# Patient Record
Sex: FEMALE | Race: WHITE
[De-identification: names, ages, dates, MRNs, and addresses within clinical notes are randomized per-mention and may not be internally consistent; named-entity substitution may affect disease eponyms.]

---

## 2020-11-11 ENCOUNTER — HOSPITAL ENCOUNTER (INPATIENT)
Dept: HOSPITAL 11 - JP.2SS | Age: 85
LOS: 6 days | Discharge: NURSING, REHAB OR LONG-TERM CARE | DRG: 951 | End: 2020-11-17
Attending: INTERNAL MEDICINE | Admitting: INTERNAL MEDICINE
Payer: MEDICARE

## 2020-11-11 DIAGNOSIS — J12.89: ICD-10-CM

## 2020-11-11 DIAGNOSIS — R45.1: ICD-10-CM

## 2020-11-11 DIAGNOSIS — S22.089D: ICD-10-CM

## 2020-11-11 DIAGNOSIS — Z86.19: ICD-10-CM

## 2020-11-11 DIAGNOSIS — Z51.5: Primary | ICD-10-CM

## 2020-11-11 DIAGNOSIS — Z79.899: ICD-10-CM

## 2020-11-11 DIAGNOSIS — R63.0: ICD-10-CM

## 2020-11-11 DIAGNOSIS — F03.90: ICD-10-CM

## 2020-11-11 DIAGNOSIS — Z66: ICD-10-CM

## 2020-11-11 DIAGNOSIS — Z88.5: ICD-10-CM

## 2020-11-11 DIAGNOSIS — Z79.82: ICD-10-CM
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
## 2020-11-11 DIAGNOSIS — Z88.2: ICD-10-CM

## 2020-11-11 DIAGNOSIS — Z88.6: ICD-10-CM

## 2020-11-11 DIAGNOSIS — Z88.1: ICD-10-CM

## 2020-11-11 DIAGNOSIS — S72.91XD: ICD-10-CM

## 2020-11-11 RX ADMIN — LORAZEPAM PRN MG: 2 LIQUID ORAL at 17:16

## 2020-11-11 RX ADMIN — LORAZEPAM PRN MG: 2 LIQUID ORAL at 22:44

## 2020-11-11 RX ADMIN — LORAZEPAM PRN MG: 2 LIQUID ORAL at 20:54

## 2020-11-11 NOTE — PCM.HP.2
H&P History of Present Illness





- General


Date of Service: 11/11/20


Admit Problem/Dx: 


                           Admission Diagnosis/Problem





Admission Diagnosis/Problem      Palliative care








Source of Information: Old Records, Provider, RN Notes Reviewed.  No: Patient


History Limitations: Reports: Other (Confused and agitated)





- History of Present Illness


Initial Comments - Free Text/Narative: 





Ms. Patten is an 85-year-old woman who was admitted to swing bed status 

on transfer from [facility] in [facility].  She has been 

hospitalized there over the past week and a half with hypoxia secondary to Covid

and bilateral pneumonia, acute kidney injury, right femur fracture, and T12 

fracture.  Acute kidney injury has resolved and she is over most of her Covid 

infection.  During the course of hospitalization she became more confused and 

agitated, not cooperating with cares including refusing to eat.  Patient's 

family has requested comfort cares only and she is transferred to our facility 

to swing bed status for palliative care.  Because of her confusion and agitation

she is unable to provide meaningful history concerning her recent symptoms or 

review of systems.





- Related Data


Allergies/Adverse Reactions: 


                                    Allergies











Allergy/AdvReac Type Severity Reaction Status Date / Time


 


Sulfa (Sulfonamide Allergy  Hives Verified 06/23/14 12:33





Antibiotics)     


 


etodolac [From Lodine] AdvReac  Diarrhea Verified 11/11/20 17:11


 


indomethacin AdvReac  Nausea and Verified 11/11/20 17:11





   Vomiting  


 


tramadol AdvReac  Nausea and Verified 11/11/20 17:11





   Vomiting  











Home Medications: 


                                    Home Meds





Aspirin 81 mg PO DAILY 06/23/14 [History]


Ibuprofen [Wal-Profen] 200 mg PO BEDTIME 06/23/14 [History]


Lisinopril/Hydrochlorothiazide [Lisinopril-Hctz 20-12.5 mg Tab] 1 each PO DAILY 

06/23/14 [History]











H&P Review of Systems





- Review of Systems:


Review Of Systems: See Below


General: Reports: ROS unobtainable (Secondary to confusion and agitation)





Exam





- Exam


Exam: See Below





- Exam


General: Lethargic.  No: Cooperative


HEENT: Conjunctiva Clear, Mucosa Moist & Pink, Normal Nasal Septum, Posterior 

Pharynx Clear, Pupils Equal


Neck: Supple, Trachea Midline, +2 Carotid Pulse wo Bruit


Lungs: Clear to Auscultation, Normal Respiratory Effort


Cardiovascular: Regular Rate, Regular Rhythm, Normal S1, Normal S2.  No: 

Systolic Murmur, Diastolic Murmur


GI/Abdominal Exam: Soft, Non-Tender, No Organomegaly, No Distention


Extremities: Non-Tender, No Pedal Edema, Other (Right lower extremity is in a 

brace)


Skin: Warm, Dry


Psychiatric: Agitated





*Q Meaningful Use (ADM)





- VTE *Q


VTE Anticoagulation Contraindications: Med/TX Not Indicated/Need





- VTE Risk Assess *Q


Each Risk Factor Represents 1 Point: None


Total Score 1 Point Risk Factors: 0


Each Risk Factor Represents 2 Points: None


Total Score 2 Point Risk Factors: 0


Each Risk Factor Represents 3 Points: Age 75 Years or Greater


Total Score 3 Point Risk Factors: 3


Each Risk Factor Represents 5 Points: Hip, Pelvis or Leg Fracture, Less than 1 

month


Total Score 5 Point Risk Factors: 5


Venous Thromboembolism Risk Factor Score *Q: 8


Problem List Initiated/Reviewed/Updated: Yes


Orders Last 24hrs: 


                               Active Orders 24 hr











 Category Date Time Status


 


 Patient Status [ADT] Routine ADT  11/11/20 16:21 Active


 


 Notify Provider Vital Signs [RC] ASDIRECTED Care  11/11/20 16:21 Active


 


 Oxygen Therapy [RC] PRN Care  11/11/20 16:21 Active


 


 Up With Assistance [RC] ASDIRECTED Care  11/11/20 16:21 Active


 


 Up to Chair [RC] QID Care  11/11/20 16:21 Active


 


 VTE/DVT Education [RC] Per Unit Routine Care  11/11/20 16:21 Active


 


 Vital Signs [RC] Q4H Care  11/11/20 16:21 Active


 


 Regular Diet [DIET] Diet  11/11/20 Lunch Active


 


 Acetaminophen [TylenoL] Med  11/11/20 16:21 Active





 650 mg PO Q4H PRN   


 


 LORazepam [Ativan ORAL Concentrate 1MG/0.5 ML U/D] Med  11/11/20 16:24 Ordered





 0.5 mg PO Q2H PRN   


 


 Morphine [Morphine 10 MG/0.5 ML Oral Syringe] Med  11/11/20 16:24 Ordered





 5 mg PO Q1H PRN   


 


 Ondansetron [Zofran ODT] Med  11/11/20 16:21 Ordered





 4 mg PO Q6H PRN   


 


 polyethylene glycoL 3350 [MiraLAX] Med  11/11/20 16:21 Ordered





 17 gm PO DAILY PRN   


 


 Resuscitation Status Routine Resus Stat  11/11/20 16:21 Ordered








                                Medication Orders





Acetaminophen (Tylenol)  650 mg PO Q4H PRN


   PRN Reason: Pain (Mild 1-3)/fever


Lorazepam (Ativan Oral Concentrate 1mg/0.5 Ml U/D)  0.5 mg PO Q2H PRN


   PRN Reason: Anxiety


Morphine Sulfate (Morphine 10 Mg/0.5 Ml Oral Syringe)  5 mg PO Q1H PRN


   PRN Reason: Pain


Ondansetron HCl (Zofran Odt)  4 mg PO Q6H PRN


   PRN Reason: Nausea able to take PO


Polyethylene Glycol (Miralax)  17 gm PO DAILY PRN


   PRN Reason: Constipation








Assessment/Plan Comment:: 





ASSESSMENT AND PLAN





PALLIATIVE CARE-patient has shown significant deterioration with recent Covid 

infection and femur fracture.  Family does not want further aggressive 

intervention or cares and has requested comfort cares only.


-Consider hospice admission


-Morphine and or lorazepam as needed for comfort


-No further aggressive interventions or evaluation





RECENT COVID INFECTION WITH BILATERAL PNEUMONIA AND HYPOXIA-respiratory status 

stable





ACUTE KIDNEY INJURY-resolved





CONFUSION AND AGITATION-history of underlying dementia





FEMUR FRACTURE


-Continue use of current brace


-Pain medication as needed





- Mortality Measure


Prognosis:: Poor

## 2020-11-12 RX ADMIN — LORAZEPAM PRN MG: 2 LIQUID ORAL at 22:37

## 2020-11-12 RX ADMIN — LORAZEPAM PRN MG: 2 LIQUID ORAL at 19:44

## 2020-11-12 RX ADMIN — LORAZEPAM PRN MG: 2 LIQUID ORAL at 07:53

## 2020-11-12 RX ADMIN — LORAZEPAM PRN MG: 2 LIQUID ORAL at 01:10

## 2020-11-12 RX ADMIN — LORAZEPAM PRN MG: 2 LIQUID ORAL at 09:53

## 2020-11-12 RX ADMIN — Medication SCH: at 20:01

## 2020-11-12 RX ADMIN — DIVALPROEX SODIUM SCH MG: 125 CAPSULE, COATED PELLETS ORAL at 16:59

## 2020-11-13 RX ADMIN — LORAZEPAM PRN MG: 2 LIQUID ORAL at 01:20

## 2020-11-13 RX ADMIN — DIVALPROEX SODIUM SCH: 125 CAPSULE, COATED PELLETS ORAL at 08:27

## 2020-11-13 RX ADMIN — Medication SCH: at 23:13

## 2020-11-13 RX ADMIN — LORAZEPAM PRN MG: 2 LIQUID ORAL at 13:00

## 2020-11-13 RX ADMIN — Medication SCH: at 08:27

## 2020-11-13 RX ADMIN — LORAZEPAM PRN MG: 2 LIQUID ORAL at 04:55

## 2020-11-13 RX ADMIN — DIVALPROEX SODIUM SCH: 125 CAPSULE, COATED PELLETS ORAL at 16:20

## 2020-11-14 RX ADMIN — LORAZEPAM PRN MG: 2 LIQUID ORAL at 18:09

## 2020-11-14 RX ADMIN — LORAZEPAM PRN MG: 2 LIQUID ORAL at 02:54

## 2020-11-14 RX ADMIN — Medication SCH: at 11:06

## 2020-11-14 RX ADMIN — LORAZEPAM PRN MG: 2 LIQUID ORAL at 12:21

## 2020-11-14 RX ADMIN — DIVALPROEX SODIUM SCH: 125 CAPSULE, COATED PELLETS ORAL at 11:06

## 2020-11-14 RX ADMIN — Medication SCH: at 22:01

## 2020-11-15 RX ADMIN — Medication SCH: at 22:23

## 2020-11-15 RX ADMIN — Medication SCH: at 10:03

## 2020-11-15 RX ADMIN — LORAZEPAM PRN MG: 2 LIQUID ORAL at 07:34

## 2020-11-15 RX ADMIN — LORAZEPAM PRN MG: 2 LIQUID ORAL at 11:27

## 2020-11-15 RX ADMIN — LORAZEPAM PRN MG: 2 LIQUID ORAL at 22:12

## 2020-11-15 RX ADMIN — LORAZEPAM PRN MG: 2 LIQUID ORAL at 09:42

## 2020-11-15 RX ADMIN — LORAZEPAM PRN MG: 2 LIQUID ORAL at 15:45

## 2020-11-15 RX ADMIN — LORAZEPAM PRN MG: 2 LIQUID ORAL at 04:47

## 2020-11-16 RX ADMIN — LORAZEPAM PRN MG: 2 LIQUID ORAL at 02:18

## 2020-11-16 RX ADMIN — LORAZEPAM PRN MG: 2 LIQUID ORAL at 22:28

## 2020-11-16 RX ADMIN — LORAZEPAM PRN MG: 2 LIQUID ORAL at 18:14

## 2020-11-16 RX ADMIN — LORAZEPAM PRN MG: 2 LIQUID ORAL at 09:10

## 2020-11-16 RX ADMIN — Medication SCH: at 22:29

## 2020-11-16 RX ADMIN — LORAZEPAM PRN MG: 2 LIQUID ORAL at 12:47

## 2020-11-16 RX ADMIN — Medication SCH: at 09:27

## 2020-11-16 NOTE — PCM.PN
- General Info


Date of Service: 11/16/20


Subjective Update: 





No acute events since admission.  She remains lethargic and has not been 

interactive.  She has not been out of bed.  She has not had a wet brief in 24 

hours.  Blood pressure has been normal.  She has not had any hypoxia.


Functional Status: Reports: Other (obtunded )





- Patient Data


Vitals - Most Recent: 


                                Last Vital Signs











Temp  36.4 C   11/16/20 07:00


 


Pulse  98   11/16/20 07:00


 


Resp  15   11/16/20 07:00


 


BP  130/67   11/16/20 07:00


 


Pulse Ox  93 L  11/16/20 07:00











Weight - Most Recent: 58.6 kg


I&O - Last 24 Hours: 


                                 Intake & Output











 11/15/20 11/16/20 11/16/20





 22:59 06:59 14:59


 


Intake Total 0  


 


Balance 0  











Med Orders - Current: 


                               Current Medications





Acetaminophen (Tylenol)  650 mg PO Q4H PRN


   PRN Reason: Pain (Mild 1-3)/fever


   Last Admin: 11/12/20 09:53 Dose:  650 mg


   Documented by: 


Fentanyl (Duragesic)  12 mcg TRDERM Q72H [facility]


   Last Admin: 11/15/20 15:28 Dose:  12 mcg


   Documented by: 


Haloperidol Lactate (Haldol 2 Mg/Ml Soln)  1 mg PO Q2H PRN


   PRN Reason: Agitation


Lorazepam (Ativan Oral Concentrate 1mg/0.5 Ml U/D)  0.5 mg PO Q2H PRN


   PRN Reason: Anxiety


   Last Admin: 11/16/20 12:47 Dose:  0.5 mg


   Documented by: 


Morphine Sulfate (Morphine 10 Mg/0.5 Ml Oral Syringe)  5 mg PO Q1H PRN


   PRN Reason: Pain


   Last Admin: 11/16/20 05:50 Dose:  5 mg


   Documented by: 


Fentanyl Patch Check  0 each TOP BID [facility]


   Last Admin: 11/16/20 09:27 Dose:  Not Given


   Documented by: 


Ondansetron HCl (Zofran Odt)  4 mg PO Q6H PRN


   PRN Reason: Nausea able to take PO


Polyethylene Glycol (Miralax)  17 gm PO DAILY PRN


   PRN Reason: Constipation





Discontinued Medications





Divalproex Sodium (Depakote Sprinkle)  125 mg PO BIDMEALS [facility]


   Last Admin: 11/14/20 11:06 Dose:  Not Given


   Documented by: 


Haloperidol Lactate (Haldol)  2 mg IM ONETIME ONE


   Stop: 11/12/20 11:31


   Last Admin: 11/12/20 11:15 Dose:  2 mg


   Documented by: 


Melatonin (Melatonin)  9 mg PO BEDTIME [facility]


   Last Admin: 11/13/20 23:13 Dose:  Not Given


   Documented by: 











- Exam


Quality Assessment: No: Supplemental Oxygen


General: No Acute Distress, Obtunded.  No: Alert


Lungs: Normal Respiratory Effort.  No: Wheezing


Cardiovascular: Regular Rhythm, Tachycardia


GI/Abdominal Exam: Soft, No Distention


Extremities: No Pedal Edema.  No: Increased Warmth


Skin: Warm, Dry


Psy/Mental Status: No: Alert, Agitated





Sepsis Event Note





- Evaluation


Sepsis Screening Result: No Definite Risk





- Focused Exam


Vital Signs: 


                                   Vital Signs











  Temp Pulse Resp BP Pulse Ox


 


 11/16/20 07:00  36.4 C  98  15  130/67  93 L














- Problem List Review


Problem List Initiated/Reviewed/Updated: Yes





- Plan


Plan:: 





ASSESSMENT AND PLAN





PALLIATIVE CARE-patient has shown significant deterioration with recent Covid 

infection and femur fracture.  Family does not want further aggressive 

intervention or cares and has requested comfort cares only.  No significant 

issues since admission.


-Consider transition to hospice after the hospital stay


-Morphine and or lorazepam as needed for comfort


-No further aggressive interventions or evaluation





RECENT COVID INFECTION WITH BILATERAL PNEUMONIA AND HYPOXIA-respiratory status 

stable





ACUTE KIDNEY INJURY-resolved





CONFUSION AND AGITATION-history of underlying dementia





FEMUR FRACTURE


-Continue use of current brace


-Pain medication as needed





Disposition-anticipate discharge to the nursing home for additional comfort care

tomorrow





Dameon Lua MD

## 2020-11-17 RX ADMIN — LORAZEPAM PRN MG: 2 LIQUID ORAL at 14:34

## 2020-11-17 RX ADMIN — LORAZEPAM PRN MG: 2 LIQUID ORAL at 09:16

## 2020-11-17 RX ADMIN — Medication SCH: at 08:23

## 2020-11-17 NOTE — PCM.DCSUM1
**Discharge Summary





- Hospital Course


Brief History: 85-year-old female who was admitted to swing bed status after a 

recent hospitalization for COVID-19 pneumonia with acute respiratory failure, 

femur fracture, T12 compression fracture, acute kidney injury and agitation and 

anorexia.


Diagnosis: Stroke: No





- Discharge Data


Discharge Date: 11/17/20


Discharge Disposition: DC/Tfer to SNF 03


Condition: Poor





- Referral to Home Health


Primary Care Physician: 


Yayo Stevens MD








- Discharge Diagnosis/Problem(s)


(1) COVID-19


SNOMED Code(s): 202098762


   ICD Code: U07.1 - COVID-19   Status: Acute   Current Visit: Yes   





(2) Pneumonia due to COVID-19 virus


SNOMED Code(s): 248814458588703947


   ICD Code: U07.1 - COVID-19; J12.89 - OTHER VIRAL PNEUMONIA   Status: Acute   

Current Visit: Yes   





- Patient Summary/Data


Hospital Course: 





Deanne was admitted to swing bed status with comfort cares after a hospital stay 

at [facility] in [place].  She had been treated for COVID-19 infection with 

bilateral pneumonia and hypoxic respiratory failure as well as acute kidney 

injury, nonoperative hip fracture, compression fracture at T12 as well as 

agitation and anorexia.  Patient was transitioned to comfort cares.  

Unfortunately there were no nursing home beds available so she was admitted to 

swing bed status until she could be transferred to a nursing home.  There were 

no significant issues during the course of the hospital stay.  She has rested 

comfortably.  She has not had any significant agitation.  She has not appeared 

to be in pain.  She has not had anything to eat or drink.  She is making only 

very small quantities of urine at this time.  Vital signs have surprisingly 

remained stable.  The plan is for discharge to the nursing home for comfort 

cares.





- Patient Instructions


Diet: Pureed


Activity: As Tolerated


Other/Special Instructions: Comfort care only.  DNR/DNI with comfort measures





- Discharge Plan


*PRESCRIPTION DRUG MONITORING PROGRAM REVIEWED*: Not Applicable


*COPY OF PRESCRIPTION DRUG MONITORING REPORT IN PATIENT VLADISLAV: Not Applicable


Prescriptions/Med Rec: 


LORazepam [Ativan ORAL Concentrate 1MG/0.5 ML U/D] 0.5 mg PO Q2H PRN #30 ml


 PRN Reason: Anxiety


fentaNYL [Duragesic] 12 mcg TRDERM Q72H #10 patch


Morphine [Morphine 10 MG/0.5 ML Oral Syringe] 5 mg PO Q1H PRN #60 ml


 PRN Reason: Pain/Air Hunger


Acetaminophen [Tylenol] 650 mg RECTAL Q4H PRN #100 tablet


 PRN Reason: Pain (Mild 1-3)/fever


Home Medications: 


                                    Home Meds





Acetaminophen [Tylenol] 650 mg RECTAL Q4H PRN #100 tablet 11/17/20 [Rx]


LORazepam [Ativan ORAL Concentrate 1MG/0.5 ML U/D] 0.5 mg PO Q2H PRN #30 ml 

11/17/20 [Rx]


Morphine [Morphine 10 MG/0.5 ML Oral Syringe] 5 mg PO Q1H PRN #60 ml 11/17/20 

[Rx]


fentaNYL [Duragesic] 12 mcg TRDERM Q72H #10 patch 11/17/20 [Rx]








Oxygen Therapy Mode: Room Air


Patient Handouts:  COVID-19





- Discharge Summary/Plan Comment


DC Time >30 min.: No





- Patient Data


Vitals - Most Recent: 


                                Last Vital Signs











Temp  35.5 C L  11/17/20 08:20


 


Pulse  117 H  11/17/20 08:20


 


Resp  16   11/17/20 08:20


 


BP  164/80 H  11/17/20 08:20


 


Pulse Ox  91 L  11/17/20 08:20











Weight - Most Recent: 58.6 kg


I&O - Last 24 hours: 


                                 Intake & Output











 11/16/20 11/17/20 11/17/20





 22:59 06:59 14:59


 


Intake Total 80  


 


Balance 80  











Med Orders - Current: 


                               Current Medications





Acetaminophen (Tylenol)  650 mg PO Q4H PRN


   PRN Reason: Pain (Mild 1-3)/fever


   Last Admin: 11/12/20 09:53 Dose:  650 mg


   Documented by: 


Fentanyl (Duragesic)  12 mcg TRDERM Q72H [facility]


   Last Admin: 11/15/20 15:28 Dose:  12 mcg


   Documented by: 


Haloperidol Lactate (Haldol 2 Mg/Ml Soln)  1 mg PO Q2H PRN


   PRN Reason: Agitation


Lorazepam (Ativan Oral Concentrate 1mg/0.5 Ml U/D)  0.5 mg PO Q2H PRN


   PRN Reason: Anxiety


   Last Admin: 11/17/20 09:16 Dose:  0.5 mg


   Documented by: 


Morphine Sulfate (Morphine 10 Mg/0.5 Ml Oral Syringe)  5 mg PO Q1H PRN


   PRN Reason: Pain


   Last Admin: 11/17/20 08:23 Dose:  5 mg


   Documented by: 


Fentanyl Patch Check  0 each TOP BID [facility]


   Last Admin: 11/17/20 08:23 Dose:  Not Given


   Documented by: 


Ondansetron HCl (Zofran Odt)  4 mg PO Q6H PRN


   PRN Reason: Nausea able to take PO


Polyethylene Glycol (Miralax)  17 gm PO DAILY PRN


   PRN Reason: Constipation





Discontinued Medications





Divalproex Sodium (Depakote Sprinkle)  125 mg PO BIDMEALS [facility]


   Last Admin: 11/14/20 11:06 Dose:  Not Given


   Documented by: 


Haloperidol Lactate (Haldol)  2 mg IM ONETIME ONE


   Stop: 11/12/20 11:31


   Last Admin: 11/12/20 11:15 Dose:  2 mg


   Documented by: 


Melatonin (Melatonin)  9 mg PO BEDTIME [facility]


   Last Admin: 11/13/20 23:13 Dose:  Not Given


   Documented by: 











*Q Meaningful Use (DIS)





- VTE *Q


VTE Anticoagulation Contraindications: Med/TX Not Indicated/Need